# Patient Record
Sex: MALE | Race: WHITE
[De-identification: names, ages, dates, MRNs, and addresses within clinical notes are randomized per-mention and may not be internally consistent; named-entity substitution may affect disease eponyms.]

---

## 2019-09-10 ENCOUNTER — HOSPITAL ENCOUNTER (OUTPATIENT)
Dept: HOSPITAL 96 - M.RTH | Age: 54
End: 2019-09-10
Attending: INTERNAL MEDICINE
Payer: COMMERCIAL

## 2019-09-10 DIAGNOSIS — Z88.8: ICD-10-CM

## 2019-09-10 DIAGNOSIS — Z88.0: ICD-10-CM

## 2019-09-10 DIAGNOSIS — R53.82: ICD-10-CM

## 2019-09-10 DIAGNOSIS — Z90.49: ICD-10-CM

## 2019-09-10 DIAGNOSIS — R61: Primary | ICD-10-CM

## 2019-09-10 LAB
ABSOLUTE EOSINOPHILS: 0.5 THOU/UL (ref 0–0.7)
ABSOLUTE MONOCYTES: 0.3 THOU/UL (ref 0–1.2)
ALBUMIN SERPL-MCNC: 4 G/DL (ref 3.4–5)
ALP SERPL-CCNC: 101 U/L (ref 46–116)
ALT SERPL-CCNC: 30 U/L (ref 30–65)
ANION GAP SERPL CALC-SCNC: 8 MMOL/L (ref 7–16)
AST SERPL-CCNC: 18 U/L (ref 15–37)
BILIRUB SERPL-MCNC: 0.8 MG/DL
BUN SERPL-MCNC: 11 MG/DL (ref 7–18)
CALCIUM SERPL-MCNC: 9.1 MG/DL (ref 8.5–10.1)
CHLORIDE SERPL-SCNC: 104 MMOL/L (ref 98–107)
CO2 SERPL-SCNC: 27 MMOL/L (ref 21–32)
CREAT SERPL-MCNC: 1.3 MG/DL (ref 0.6–1.3)
EOSINOPHIL NFR BLD: 6 %
ESR (SEDRATE): 3 MM/HR (ref 0–20)
GLUCOSE SERPL-MCNC: 89 MG/DL (ref 70–99)
GRANULOCYTES NFR BLD MANUAL: 60 %
HCT VFR BLD CALC: 46 % (ref 42–52)
HGB BLD-MCNC: 15.3 G/DL (ref 13–17.7)
HGB BLD-MCNC: 15.5 GM/DL (ref 14–18)
LYMPHOCYTES # BLD: 2.3 THOU/UL (ref 0.8–5.3)
LYMPHOCYTES NFR BLD AUTO: 30 %
MCH RBC QN AUTO: 29.7 PG (ref 26–34)
MCHC RBC AUTO-ENTMCNC: 33.7 G/DL (ref 28–37)
MCV RBC: 88.3 FL (ref 80–100)
MONOCYTES NFR BLD: 4 %
MPV: 9.6 FL. (ref 7.2–11.1)
NEUTROPHILS # BLD: 4.6 THOU/UL (ref 1.6–8.1)
NUCLEATED RBCS: 0 /100WBC
PLATELET # BLD EST: ADEQUATE 10*3/UL
PLATELET COUNT*: 220 THOU/UL (ref 150–400)
POTASSIUM SERPL-SCNC: 4.2 MMOL/L (ref 3.5–5.1)
PROT SERPL-MCNC: 7.7 G/DL (ref 6.4–8.2)
RBC # BLD AUTO: 5.21 MIL/UL (ref 4.5–6)
RBC MORPH BLD: NORMAL
RDW-CV: 13.4 % (ref 10.5–14.5)
SODIUM SERPL-SCNC: 139 MMOL/L (ref 136–145)
WBC # BLD AUTO: 7.7 THOU/UL (ref 4–11)

## 2019-09-12 NOTE — HEMONC
[address]  [address]                    HEMATOLOGY ONCOLOGY NOTE      
_______________________________________________________________________________
 
Name:       RICARDO DELACRUZ                  Room:                      [facility] 
ALBERT.#:  [id number]      Account #:      [id number]  
Admission:  09/10/19     Attend Phys:    Reji Leon MD 
Discharge:               Date of Birth:  [birth year]  
         Report #: [id number]
                                                                     [id number]  
_______________________________________________________________________________
THIS REPORT FOR:  //name//                      
 
CC: Sherin Shah
 
DATE OF SERVICE:  09/10/2019
 
 
CLINICAL NOTE
 
REASON FOR CONSULTATION:  Drenching night sweats/fatigue.
 
SUBJECTIVE:  A 54-year-old  male with no significant past medical
history, was evaluated because of 3 months onset of significant drenching night
sweats.  The patient feels that he is soaked and he has to change.  In the last
2 weeks, he started noticing that the night sweats occurred on a daily basis,
also mildly during the day that was associated with fatigue for the last 2
months.  He denies any fevers or rigors.  He has mild fluctuating appetite with
no weight loss.  Also, reported early satiety after a few bites, which is new
for him.  He denies any lumps or bumps.  However, he felt some tenderness and
questionable small lymph nodes in the right armpit.  The patient denies any
personal history of lymphoma.  However, his grandmother had lymphoma back in
2005.  The patient denies any recent travel, sick contact or exposure to TB.  He
denies any new medications.  He denies consumption of uncooked dairy products.
 
REVIEW OF SYSTEMS:  All systems reviewed.  It was negative except the above.
 
PAST MEDICAL HISTORY:  None.
 
MEDICATIONS:  None.
 
PAST SURGICAL HISTORY:  Cholecystectomy, hemorrhoid surgery.
 
SOCIAL HISTORY:  He is an active smoker and he chews tobacco since age of 17. 
He drinks alcohol occasionally.
 
ALLERGIES:  PENICILLIN CAUSES RASH.
 
PHYSICAL EXAMINATION:
VITAL SIGNS:  Blood pressure is 125/85, pulse is 76, respirations 20,
temperature is 96.8, saturations 97% on room air.
GENERAL:  The patient was sitting in chair, was not in acute distress.
LUNGS:  Clear to auscultations bilaterally.  No wheezing or crackles.
ABDOMEN:  Soft, however, the patient had tenderness in the left upper quadrant. 
Bowel sounds were positive.  No hepatosplenomegaly.
 
 
 
[place] [address]                    HEMATOLOGY ONCOLOGY NOTE      
_______________________________________________________________________________
 
Name:       RICARDO DELACRUZ                  Room:                      REG CLI 
M.R.#:  [id number]      Account #:      [id number]  
Admission:  09/10/19     Attend Phys:    Reji Leon MD 
Discharge:               Date of Birth:  [birth year]  
         Report #: [id number]
                                                                     [id number]  
_______________________________________________________________________________
EXTREMITIES:  No edema, no cyanosis, no clubbing.
 
ASSESSMENT AND PLAN:  A 54-year-old  male who was evaluated because of
6 months' duration of progressive night sweats associated with the fatigue.  No
palpable lymphadenopathy.  No sick contact or recent travel.
 
RECOMMENDATIONS:
1.  The patient had significant night sweats and I would like to rule out any
possibility of lymphoma considering his family history.  We will obtain CT scan
of the chest, abdomen and pelvis also to rule out any splenomegaly.
2.  We will obtain flow cytometry.  Secondary causes including infection, we
will obtain blood cultures, ESR, CRP.  Follow up in 2 weeks to review his
results.
 
 
 
 
 
 
 
 
 
 
 
 
 
 
 
 
 
 
 
 
 
 
 
 
 
 
 
 
 
 
 
<ELECTRONICALLY SIGNED>
                                        By:  Reji Leon MD          
09/12/19     1541
D: 09/10/19 1123_______________________________________
T: 09/10/19 1500Moalycia Leon MD             /nt

## 2019-09-12 NOTE — PATH
[address]  [address]                    PATHOLOGY RPT PROCEDURE       
_______________________________________________________________________________
 
Name:       RICARDO DELACRUZ                  Room:                      [facility] 
CURTIS.RODERICK.#:  [id number]      Account #:      [id number]  
Admission:  09/10/19     Date of Birth:  [birth year]  
Discharge:                             Report #:    [id number]
                                                         Path Case #: [id number]
_______________________________________________________________________________
 
[facility] Accession Number: [id number]
.                                                                01
Material submitted:                                        .
body - PERIPHERAL BLOOD
.                                                                02
**********************************************************************
Diagnosis:
Special studies report received from [facility], [address] [place] [address], [address], [place], [address], on case [id number],
labeled with their number [id number], dated 09/11/2019.
.
Flow Cytometry:  Hematologic Neoplasia Assessment
.
Clinical History
.
.
Indication for Study
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
.
Specimen
Peripheral Blood
.
Viability
75% (7AAD exclusion)
.
Interpretation
Peripheral Blood:
  - No evidence for abnormal myeloid maturation or an increased blast
    population.
  - No evidence for a B-cell or T-cell lymphoproliferative disorder.
.
Comments
Correlation with available clinical, laboratory, and morphologic data is
recommended.
.
Populations Analyzed
Myeloid Blasts:  0.1%  No significant blast population detected
Lymphocytes:      19%  B-cells: 1.5%, show kappa light chain preponderance
                       (3.4:1) but the overall pattern of light chain
                       expression is polytypic/polyclonal
                       T-cells: no significant abnormalities of the markers
                       tested
                       CD4+ T-cells: 11.0% (including 1.7% CD57+ cells)
                       CD8+ T-cells: 4.1% (including 2.2% CD57+ cells)
                       CD4:CD8: 2.7
                       NK cells: 2.3%
Neutrophils:      68%  No significant abnormalities of the markers tested
Monocytes:         5%  No significant abnormalities of the markers tested
 
[place] [address]                    PATHOLOGY RPT PROCEDURE       
_______________________________________________________________________________
 
Name:       RICARDO DELACRUZ                  Room:                      [facility]#:  [id number]      Account #:      [id number]  
Admission:  09/10/19     Date of Birth:  [birth year]  
Discharge:                             Report #:    [id number]
                                                         Path Case #: [id number]
_______________________________________________________________________________
Eosinophils:       5%  No relative increase
Basophils:       0.9%  No relative increase
CD45 Negative      2%  No significant reactivity with the markers tested
Events/Debris:         (may represent unlysed red blood cells, erythroid
                       precursors, platelets, debris, etc.)
.
Morphologic Evaluation
A slide was reviewed for  purposes only.
.
Specimen Description
Total Cell Yield: 4.66 x 10 and 6
.
Reagent(s) Used
CD2, CD3, CD4, CD5, CD7, CD8, CD10, CD11b, CD13, CD14, CD16, CD19, CD20,
CD33, CD34, CD38, CD45, CD56, CD57, CD64, , HLA-DR, kappa, lambda
.
Electronically Signed by Jona Vasquez MD at 04:54PM MST on 09/11/2019
at [facility].
Jona Vasquez MD
Hematopathologist
.
.
Intended Use
Flow cytometry is optimally used to immunophenotypically characterize
abnormal populations when they are detected. Negative flow cytometry
results do not exclude lymphoma or neoplasia. Possible false negative flow
cytometry results may occur in, but are not limited to, the following:
neoplastic cells in Hodgkin lymphoma are not typically adequately
represented by routine clinical flow cytometry; neoplastic cells may be
lost or inadequately represented due to degeneration, sample processing,
sampling artifact, or patchy involvement; plasma cells are typically
underrepresented by flow cytometry; immature cells/blasts may be
underrepresented due to hemodilution; myeloproliferative disorders and low
grade myelodysplasia may not have immunophenotypic abnormalities or
increased blasts. Correlation with all available clinical, laboratory, and
morphologic data is always necessary to assess for the possibility of
false negative flow cytometry results and to establish a diagnosis.
Each marker in this analysis was used to assess for potential antigenic
abnormalities or to evaluate detected abnormalities.
.
Disclaimer(s)
This test was performed at [facility]. at [address], [place], [address] - Medical Director: Gerson Ware MD.
[facility] is a business unit of [employer] a wholly-owned subsidiary of [employer].
.
Any image or images that accompany this report are representative images
 
[place] [address]                    PATHOLOGY RPT PROCEDURE       
_______________________________________________________________________________
 
Name:       RICARDO DELACRUZ                  Room:                      [facility] RAMIREZ#:  [id number]      Account #:      [id number]  
Admission:  09/10/19     Date of Birth:  [birth year]  
Discharge:                             Report #:    [id number]
                                                         Path Case #: [id number]
_______________________________________________________________________________
only and should not be used to render a diagnosis.
.
This test was developed and its performance characteristics determined by
[facility]. It has not been cleared or approved by the Food and
Drug Administration (FDA). The FDA has determined that such clearance or
approval is not necessary.
.
For inquiries, the physician may contact Lab: [phone]
.
A complete copy of the report is on file.
.
Professional services performed by [facility]. at [address], [place] [address].  Technical services performed by
[facility] [employer] at [address], [place]
[address].
.
(CLW:[facility] 09/12/2019)
.
[facility]  09/12/2019  [address] [place] [address]
**********************************************************************
.                                                                02
Electronically signed:                                     .
Kimmy Platt MD, Pathologist
NPI- [id number]
.                                                                02
Pathologist provided ICD-10:
Z03.89
.                                                                02
CPT                                                        .
739660
Specimen Comment: A courtesy copy of this report has been sent to
Specimen Comment: [phone], [phone].
Specimen Comment: Report sent to  / DR FAJARDO
***Performed at:  01
   [facility]
   [address]  [id number]
   MD Archie Gonzalez MD Phone:  [phone]
***Performed at:  02
   [facility]
   [address]  [id number]
   MD Spencer Kerley MD Phone:  [phone]

## 2019-09-24 ENCOUNTER — HOSPITAL ENCOUNTER (OUTPATIENT)
Dept: HOSPITAL 96 - M.RTH | Age: 54
End: 2019-09-24
Attending: INTERNAL MEDICINE
Payer: COMMERCIAL

## 2019-09-24 DIAGNOSIS — R53.83: ICD-10-CM

## 2019-09-24 DIAGNOSIS — R61: ICD-10-CM

## 2019-09-24 DIAGNOSIS — K76.0: Primary | ICD-10-CM

## 2019-09-25 NOTE — HEMONC
[address]  [address]                    HEMATOLOGY ONCOLOGY NOTE      
_______________________________________________________________________________
 
Name:       RICARDO DELACRUZ                  Room:                      REG CLI 
M..#:  [id number]      Account #:      [id number]  
Admission:  09/24/19     Attend Phys:    Reji Leon MD 
Discharge:               Date of Birth:  [birth year]  
         Report #: [id number]
                                                                     [id number]  
_______________________________________________________________________________
THIS REPORT FOR:  //name//                      
 
CC: Sherin Collins DO
    Reji Leon
 
DATE OF SERVICE:  09/24/2019
 
 
CLINIC NOTE
 
DIAGNOSIS:  Drenching night sweats/fatigue.
 
SUBJECTIVE:  The patient presented today to discuss his workup.  He had a CT
scan of chest, abdomen and pelvis, which did not show any evidence of
lymphadenopathy; however, there was diffuse hepatic steatosis.  I also obtained
a CBC, which came back completely within range with a normal differential.  His
LDH came back within normal range in addition to CRP.  However, his TSH came
back elevated at 8.2, in addition to that flow cytometry came back without any
significant abnormalities.  The patient reported in the last 4 days, he did not
have any further night sweats.
 
REVIEW OF SYSTEMS:  All systems reviewed.  It was negative except the above.
 
PAST MEDICAL, SOCIAL, AND FAMILY HISTORY:  Unchanged from last visit.
 
PHYSICAL EXAMINATION:
VITAL SIGNS:  Today, blood pressure is 137/86, pulse is 96, respirations 18,
temperature is 97.8, and saturation is 98% on room air.
GENERAL:  The patient was sitting in chair, was not in acute distress.
 
LABORATORY DATA:  WBC 8.0, hemoglobin 15.3, and platelets 226.  LDH is 134.  CRP
is 4.8.  TSH is 8.2.
 
ASSESSMENT AND PLAN:  The patient is a 54-year-old  male who has been
evaluated because of significant night sweats with the fatigue.  Clinically, the
patient does not have any evidence of lymphoma based on his CT scan.  He has a
normal CBC with a normal differential and negative flow cytometry.  Normal LDH
and CRP.  Blood cultures came back negative.
 
RECOMMENDATIONS:  Evaluate for hypothyroidism or hypogonadism with a low
testosterone level.  Follow up as needed.
 
 
<ELECTRONICALLY SIGNED>
                                        By:  Reji Leon MD          
09/25/19     1215
D: 09/24/19 1106_______________________________________
T: 09/24/19 1224Reji Leon MD             /nt